# Patient Record
Sex: FEMALE | URBAN - METROPOLITAN AREA
[De-identification: names, ages, dates, MRNs, and addresses within clinical notes are randomized per-mention and may not be internally consistent; named-entity substitution may affect disease eponyms.]

---

## 2024-09-19 DIAGNOSIS — C44.311 BASAL CELL CARCINOMA (BCC) OF LEFT NASAL TIP: Primary | ICD-10-CM

## 2024-09-23 DIAGNOSIS — C44.311 BASAL CELL CARCINOMA (BCC) OF LEFT NASAL TIP: Primary | ICD-10-CM

## 2024-10-16 ENCOUNTER — OFFICE VISIT (OUTPATIENT)
Dept: SURGERY | Facility: CLINIC | Age: 64
End: 2024-10-16
Payer: MEDICARE

## 2024-10-16 DIAGNOSIS — C44.311 BASAL CELL CARCINOMA (BCC) OF LEFT NASAL TIP: Primary | ICD-10-CM

## 2024-10-16 PROCEDURE — 99203 OFFICE O/P NEW LOW 30 MIN: CPT | Performed by: SURGERY

## 2024-10-16 RX ORDER — ESTRADIOL 10 UG/1
INSERT VAGINAL
COMMUNITY
Start: 2024-08-14

## 2024-10-16 RX ORDER — SERTRALINE HYDROCHLORIDE 25 MG/1
1 TABLET, FILM COATED ORAL
COMMUNITY
Start: 2024-05-16

## 2024-10-16 RX ORDER — ROSUVASTATIN CALCIUM 5 MG/1
5 TABLET, COATED ORAL EVERY OTHER DAY
COMMUNITY
Start: 2024-05-16

## 2024-10-16 NOTE — CONSULTS
New Patient Consultation    Chief Complaint: basal cell carcinoma left nasal tip     History of Present Illness:   Gonzalo James is a 64 year old female referred by [facility] for evaluation of a recently diagnosed basal cell carcinoma, nodular pattern, of the left nasal tip. This was diagnosed via shave biopsy on 8/21/2024. She is scheduled for a MOHs excision on 11/25/2024. She is here today to discuss options for reconstruction.      Pathology from 8/21/2024 at [facility]:  Basal cell carcinoma, nodular pattern left nasal tip    Past Medical History:      No past medical history on file.      Past Surgical History:  No past surgical history on file.      Medications:    No outpatient medications have been marked as taking for the 10/16/24 encounter (Appointment) with Dorothea Martinez MD.         Allergies:    Allergies[1]      Family History:   No family history on file.      Social History:  History   Alcohol use Not on file       History   Smoking Status    Not on file   Smokeless Tobacco    Not on file       History   Drug Use Not on file       Review of Systems:    A 13 point review of systems was performed on the intake sheet.  The patient reports   General:   The patient denies, fever, chills, night sweats, fatigue, generalized weakness, change in appetite, weight loss, or weight gain.  Endocrine:   There is no history of poor/slow wound healing, weight loss/gain, fertility or hormone problems, cold intolerance, heat intolerance, excessive thirst, or thyroid disease.   Allergic/Immunologic:  There is no history of hives, hay fever, angioedema or anaphylaxis.  HEENT:    The patient denies ear pain, ear drainage, hearing loss, change in vision, double vision, cataracts, glaucoma, nasal congestion, nosebleed, hoarseness, sore throat, or swollen glands  Respiratory:   The patient denies shortness of breath, cough, bloody cough, phlegm, asthma, or wheezing  Cardiovascular:  The patient  denies chest pain/pressure, palpitations, irregular heartbeat, high blood pressure, stroke, or leg swelling  Breasts:  Patient denies breast masses, pain, change in the breast skin, skin dimpling, nipple discharge, or rash  Gastrointestinal:   There is no history of difficulty or pain with swallowing, reflux symptoms, nausea, vomiting, dark/ bloody stools, diarrhea, constipation,  change in bowel habits, or abdominal pain.   Genitourinary:  The patient denies frequent urination, needing to get up at night to urinate, urinary hesitancy or retaining urine, painful urination, urinary incontinence, decreased urine stream, blood in the urine, or vaginal/penile discharge.  Skin:   The patient denies rash, itching, skin lesions, dry skin, change in skin color or change in moles, sunburns, or sunburns with blistering.   Hematologic/Lymphatic:  The patient denies easily bruising or bleeding, persistent swollen glands or lymph nodes, bleeding disorders, blood clots, or pulmonary embolism.   Gynecologic:  The patient denies irregular menses, pelvic pain, pain with intercourse, painful menses, or pregnancy  Musculoskeletal:  The patient denies muscle aches/pain, joint pain, stiff joints, neck pain, back pain or bone pain.  Neurologic:  There is no history of migraines or severe headaches, seizure/epilepsy, speech problems, coordination problems, trembling/tremors, fainting/black outs, dizziness, memory problems, loss of sensation/numbness, problems walking, weakness, tingling or burning in hands/feet.   Psychiatric:  There is no history of abusive relationship, bipolar disorder, sleep disturbance, anxiety, depression or feeling of despair.    Physical Exam:    There were no vitals taken for this visit.    Constitutional: The patient is an alert, oriented and well-developed.     Neurologic: Speech patterns and movements are normal.     Psychiatric: Affect is appropriate.    Eyes: Conjunctiva are clear, non-icteric. PERRL    ENT:  healed biopsy site left nasal tip     Integument/Skin: see ENT exam    Respiratory: Normal respiratory effort.     Cardiovascular: no cyanosis, no edema    Musculoskeletal: Extremities unremarkable, without edema, tenderness or deformities    Impression:   Gonzalo James  is a 64 year old with basal cell carcinoma left nasal tip    Discussion and Plan:  The patient was counseled on the different treatment options.     Patient plans for Mohs excision with [facility] on 11/25/2024. We discussed that the size of the defect will determine the reconstructive options. This will be determined following Mohs excision.   We will plan for reconstruction of the left nasal tip with local tissue rearrangement, ideally bilobe flap, possible Integra, possible skin graft. This will be done under general anesthesia. We discussed the possible role for second stage reconstruction. Representative photos were reviewed with the patient.    The different treatment options were discussed with the patient.  The procedures and the postoperative care was discussed in detail.  Potential risks complications benefits and alternatives were discussed.  Risks and complications including but not limited to infection, bleeding, scarring, damage to surrounding structures, such as nerves, blood vessels, muscles,  tendons, risk of hematoma, seroma, foreign body reaction, allergic reaction, wound dehiscences, delayed wound healing, graft failure, flap failure, need for further surgery.    Patient understands and wishes to proceed with the procedure, questions were answered.    45 minutes were spent with the patient, from which 35 minutes were spent counseling the patient and coordinating care.         [1] Not on File

## 2024-10-18 DIAGNOSIS — C44.311 BASAL CELL CARCINOMA (BCC) OF LEFT NASAL TIP: Primary | ICD-10-CM

## 2024-11-19 DIAGNOSIS — C44.311 BASAL CELL CARCINOMA (BCC) OF LEFT NASAL TIP: Primary | ICD-10-CM

## 2024-11-26 ENCOUNTER — ANESTHESIA (OUTPATIENT)
Dept: SURGERY | Facility: HOSPITAL | Age: 64
End: 2024-11-26
Payer: MEDICARE

## 2024-11-26 ENCOUNTER — HOSPITAL ENCOUNTER (OUTPATIENT)
Facility: HOSPITAL | Age: 64
Setting detail: HOSPITAL OUTPATIENT SURGERY
Discharge: HOME | End: 2024-11-26
Attending: SURGERY | Admitting: SURGERY
Payer: MEDICARE

## 2024-11-26 ENCOUNTER — ANESTHESIA EVENT (OUTPATIENT)
Dept: SURGERY | Facility: HOSPITAL | Age: 64
End: 2024-11-26
Payer: MEDICARE

## 2024-11-26 VITALS
SYSTOLIC BLOOD PRESSURE: 132 MMHG | TEMPERATURE: 97 F | OXYGEN SATURATION: 94 % | WEIGHT: 140.19 LBS | BODY MASS INDEX: 24.84 KG/M2 | DIASTOLIC BLOOD PRESSURE: 66 MMHG | RESPIRATION RATE: 18 BRPM | HEIGHT: 63 IN | HEART RATE: 102 BPM

## 2024-11-26 PROCEDURE — 0HX1XZZ TRANSFER FACE SKIN, EXTERNAL APPROACH: ICD-10-PCS | Performed by: SURGERY

## 2024-11-26 RX ORDER — ACETAMINOPHEN 500 MG
1000 TABLET ORAL ONCE
Status: DISCONTINUED | OUTPATIENT
Start: 2024-11-26 | End: 2024-11-26 | Stop reason: HOSPADM

## 2024-11-26 RX ORDER — ONDANSETRON 2 MG/ML
4 INJECTION INTRAMUSCULAR; INTRAVENOUS EVERY 6 HOURS PRN
Status: DISCONTINUED | OUTPATIENT
Start: 2024-11-26 | End: 2024-11-26

## 2024-11-26 RX ORDER — CEFAZOLIN SODIUM 1 G/3ML
INJECTION, POWDER, FOR SOLUTION INTRAMUSCULAR; INTRAVENOUS AS NEEDED
Status: DISCONTINUED | OUTPATIENT
Start: 2024-11-26 | End: 2024-11-26 | Stop reason: SURG

## 2024-11-26 RX ORDER — SODIUM CHLORIDE, SODIUM LACTATE, POTASSIUM CHLORIDE, CALCIUM CHLORIDE 600; 310; 30; 20 MG/100ML; MG/100ML; MG/100ML; MG/100ML
INJECTION, SOLUTION INTRAVENOUS CONTINUOUS
Status: DISCONTINUED | OUTPATIENT
Start: 2024-11-26 | End: 2024-11-26

## 2024-11-26 RX ORDER — HYDROMORPHONE HYDROCHLORIDE 1 MG/ML
0.2 INJECTION, SOLUTION INTRAMUSCULAR; INTRAVENOUS; SUBCUTANEOUS EVERY 5 MIN PRN
Status: DISCONTINUED | OUTPATIENT
Start: 2024-11-26 | End: 2024-11-26

## 2024-11-26 RX ORDER — HYDROCODONE BITARTRATE AND ACETAMINOPHEN 5; 325 MG/1; MG/1
2 TABLET ORAL ONCE AS NEEDED
Status: DISCONTINUED | OUTPATIENT
Start: 2024-11-26 | End: 2024-11-26

## 2024-11-26 RX ORDER — DEXAMETHASONE SODIUM PHOSPHATE 4 MG/ML
VIAL (ML) INJECTION AS NEEDED
Status: DISCONTINUED | OUTPATIENT
Start: 2024-11-26 | End: 2024-11-26 | Stop reason: SURG

## 2024-11-26 RX ORDER — ACETAMINOPHEN 500 MG
1000 TABLET ORAL ONCE AS NEEDED
Status: DISCONTINUED | OUTPATIENT
Start: 2024-11-26 | End: 2024-11-26

## 2024-11-26 RX ORDER — LIDOCAINE HYDROCHLORIDE AND EPINEPHRINE 10; 10 MG/ML; UG/ML
INJECTION, SOLUTION INFILTRATION; PERINEURAL AS NEEDED
Status: DISCONTINUED | OUTPATIENT
Start: 2024-11-26 | End: 2024-11-26 | Stop reason: HOSPADM

## 2024-11-26 RX ORDER — ONDANSETRON 2 MG/ML
INJECTION INTRAMUSCULAR; INTRAVENOUS AS NEEDED
Status: DISCONTINUED | OUTPATIENT
Start: 2024-11-26 | End: 2024-11-26 | Stop reason: SURG

## 2024-11-26 RX ORDER — SCOLOPAMINE TRANSDERMAL SYSTEM 1 MG/1
1 PATCH, EXTENDED RELEASE TRANSDERMAL ONCE
Status: DISCONTINUED | OUTPATIENT
Start: 2024-11-26 | End: 2024-11-26 | Stop reason: HOSPADM

## 2024-11-26 RX ORDER — MIDAZOLAM HYDROCHLORIDE 1 MG/ML
INJECTION INTRAMUSCULAR; INTRAVENOUS AS NEEDED
Status: DISCONTINUED | OUTPATIENT
Start: 2024-11-26 | End: 2024-11-26 | Stop reason: SURG

## 2024-11-26 RX ORDER — EPHEDRINE SULFATE 50 MG/ML
INJECTION INTRAVENOUS AS NEEDED
Status: DISCONTINUED | OUTPATIENT
Start: 2024-11-26 | End: 2024-11-26 | Stop reason: SURG

## 2024-11-26 RX ORDER — HYDROMORPHONE HYDROCHLORIDE 1 MG/ML
0.4 INJECTION, SOLUTION INTRAMUSCULAR; INTRAVENOUS; SUBCUTANEOUS EVERY 5 MIN PRN
Status: DISCONTINUED | OUTPATIENT
Start: 2024-11-26 | End: 2024-11-26

## 2024-11-26 RX ORDER — HYDROCODONE BITARTRATE AND ACETAMINOPHEN 5; 325 MG/1; MG/1
1 TABLET ORAL ONCE AS NEEDED
Status: DISCONTINUED | OUTPATIENT
Start: 2024-11-26 | End: 2024-11-26

## 2024-11-26 RX ORDER — NALOXONE HYDROCHLORIDE 0.4 MG/ML
0.08 INJECTION, SOLUTION INTRAMUSCULAR; INTRAVENOUS; SUBCUTANEOUS AS NEEDED
Status: DISCONTINUED | OUTPATIENT
Start: 2024-11-26 | End: 2024-11-26

## 2024-11-26 RX ORDER — PROCHLORPERAZINE EDISYLATE 5 MG/ML
5 INJECTION INTRAMUSCULAR; INTRAVENOUS EVERY 8 HOURS PRN
Status: DISCONTINUED | OUTPATIENT
Start: 2024-11-26 | End: 2024-11-26

## 2024-11-26 RX ORDER — HYDROMORPHONE HYDROCHLORIDE 1 MG/ML
0.6 INJECTION, SOLUTION INTRAMUSCULAR; INTRAVENOUS; SUBCUTANEOUS EVERY 5 MIN PRN
Status: DISCONTINUED | OUTPATIENT
Start: 2024-11-26 | End: 2024-11-26

## 2024-11-26 RX ORDER — PHENYLEPHRINE HCL 10 MG/ML
VIAL (ML) INJECTION AS NEEDED
Status: DISCONTINUED | OUTPATIENT
Start: 2024-11-26 | End: 2024-11-26 | Stop reason: SURG

## 2024-11-26 RX ORDER — KETOROLAC TROMETHAMINE 30 MG/ML
INJECTION, SOLUTION INTRAMUSCULAR; INTRAVENOUS AS NEEDED
Status: DISCONTINUED | OUTPATIENT
Start: 2024-11-26 | End: 2024-11-26 | Stop reason: SURG

## 2024-11-26 RX ORDER — LIDOCAINE HYDROCHLORIDE 10 MG/ML
INJECTION, SOLUTION EPIDURAL; INFILTRATION; INTRACAUDAL; PERINEURAL AS NEEDED
Status: DISCONTINUED | OUTPATIENT
Start: 2024-11-26 | End: 2024-11-26 | Stop reason: SURG

## 2024-11-26 RX ADMIN — PHENYLEPHRINE HCL 100 MCG: 10 MG/ML VIAL (ML) INJECTION at 09:46:00

## 2024-11-26 RX ADMIN — KETOROLAC TROMETHAMINE 30 MG: 30 INJECTION, SOLUTION INTRAMUSCULAR; INTRAVENOUS at 09:40:00

## 2024-11-26 RX ADMIN — PHENYLEPHRINE HCL 100 MCG: 10 MG/ML VIAL (ML) INJECTION at 09:33:00

## 2024-11-26 RX ADMIN — LIDOCAINE HYDROCHLORIDE 50 MG: 10 INJECTION, SOLUTION EPIDURAL; INFILTRATION; INTRACAUDAL; PERINEURAL at 09:28:00

## 2024-11-26 RX ADMIN — EPHEDRINE SULFATE 10 MG: 50 INJECTION INTRAVENOUS at 10:03:00

## 2024-11-26 RX ADMIN — ONDANSETRON 4 MG: 2 INJECTION INTRAMUSCULAR; INTRAVENOUS at 09:40:00

## 2024-11-26 RX ADMIN — MIDAZOLAM HYDROCHLORIDE 2 MG: 1 INJECTION INTRAMUSCULAR; INTRAVENOUS at 09:24:00

## 2024-11-26 RX ADMIN — SODIUM CHLORIDE, SODIUM LACTATE, POTASSIUM CHLORIDE, CALCIUM CHLORIDE: 600; 310; 30; 20 INJECTION, SOLUTION INTRAVENOUS at 09:22:00

## 2024-11-26 RX ADMIN — CEFAZOLIN SODIUM 2 G: 1 INJECTION, POWDER, FOR SOLUTION INTRAMUSCULAR; INTRAVENOUS at 09:38:00

## 2024-11-26 RX ADMIN — EPHEDRINE SULFATE 10 MG: 50 INJECTION INTRAVENOUS at 10:24:00

## 2024-11-26 RX ADMIN — DEXAMETHASONE SODIUM PHOSPHATE 8 MG: 4 MG/ML VIAL (ML) INJECTION at 09:40:00

## 2024-11-26 NOTE — BRIEF OP NOTE
Pre-Operative Diagnosis: Basal cell carcinoma (BCC) of left nasal tip [C44.311]     Post-Operative Diagnosis: Basal cell carcinoma (BCC) of left nasal tip [C44.311]      Procedure Performed:   Reconstruction of the left nasal tip with local tissue rearrangement,    Surgeons and Role:     * Dorothea Martinez MD - Primary    Assistant(s):  PA: Fabby Guzman PA     Surgical Findings: see dictation     Specimen: none     Estimated Blood Loss: Blood Output: 2 mL (11/26/2024 10:43 AM)    MACK Peter  11/26/2024  10:56 AM

## 2024-11-26 NOTE — ANESTHESIA POSTPROCEDURE EVALUATION
[facility]    Gonzalo James Patient Status:  Hospital Outpatient Surgery   Age/Gender 64 year old female MRN [id number]   Location [facility] PERIOPERATIVE SERVICE Attending Dorothea Martinez MD   Hosp Day # 0 PCP Lauri Curran       Anesthesia Post-op Note    Reconstruction of the left nasal tip with local tissue rearrangement,    Procedure Summary       Date: 11/26/24 Room / Location:  MAIN OR 06 /  MAIN OR    Anesthesia Start: 0922 Anesthesia Stop: 1056    Procedure: Reconstruction of the left nasal tip with local tissue rearrangement, Diagnosis:       Basal cell carcinoma (BCC) of left nasal tip      (Basal cell carcinoma (BCC) of left nasal tip [C44.311])    Surgeons: Dorothea Martinez MD Anesthesiologist: Heraclio Leong MD    Anesthesia Type: general ASA Status: 2            Anesthesia Type: general    Vitals Value Taken Time   /66 11/26/24 1148   Temp 97 °F (36.1 °C) 11/26/24 1118   Pulse 102 11/26/24 1153   Resp 18 11/26/24 1118   SpO2 94 % 11/26/24 1153   Vitals shown include unfiled device data.    Patient Location: PACU    Anesthesia Type: general    Airway Patency: patent and extubated    Postop Pain Control: adequate    Mental Status: preanesthetic baseline    Nausea/Vomiting: none    Cardiopulmonary/Hydration status: stable euvolemic    Complications: no apparent anesthesia related complications    Postop vital signs: stable    Comments: Report given to RN    Dental Exam: Unchanged from Preop    Patient to be discharged from PACU when criteria met.

## 2024-11-26 NOTE — ANESTHESIA PROCEDURE NOTES
Airway  Date/Time: 11/26/2024 9:29 AM  Urgency: elective    Airway not difficult    General Information and Staff    Patient location during procedure: OR  Anesthesiologist: Heraclio Leong MD  Performed: anesthesiologist   Performed by: Heraclio Leong MD  Authorized by: Heraclio Leong MD      Indications and Patient Condition  Indications for airway management: anesthesia  Sedation level: deep  Preoxygenated: yes  Patient position: sniffing  Mask difficulty assessment: 0 - not attempted    Final Airway Details  Final airway type: supraglottic airway      Successful airway: flexible  Size 4       Number of attempts at approach: 1

## 2024-11-26 NOTE — DISCHARGE INSTRUCTIONS
Plastic Surgery Home Care Instructions      We hope you were pleased with your care at [facility].  We wish you the best outcome and overall experience with your operation.  These instructions will help to minimize pain, optimize healing, and improve the likelihood of a successful result.    What To Expect  There may be some spotting of the incision lines for the next few days.  Mild bruising, mild swelling and discomfort in the surgical area is typical.     Bandages (Dressing)  Leave steri-strips in place. If these fall off on their own, that is ok. Apply antibiotic ointment (neosporin, bacitracin, etc) daily if the steri-strips fall off.  Apply antibiotic ointment inside the left nostril.     Bathing/Showers  You can resume showering tomorrow. Let soap and water run over the incision, don't scrub.   No baths, swimming, or hot tubs until you receive medical permission    Over-The-Counter Medication  You can take Tylenol after surgery for pain relief as needed  You can take Aleve or ibuprofen starting 24 hours after surgery  Take as directed on the bottle    Home Medication  Resume your home medications as instructed  Do not resume herbal medications for two weeks    Diet  Resume your normal diet    Activity  Avoid strenuous activity     Return to Work or School  You can return to work when you are not taking pain medication, if your work does not involve strenuous activity.  Contact the office, if you need a medical note.     Follow-up Appointment   Our office will call you to set up a time    Questions or Concerns  Call Dr. Martinez's office if you experience bleeding that is not controlled by holding pressure for 20 minutes.     Gonzalo   Thank you for coming to [facility] for your operation.  The nurses and the anesthesiologist try very hard to make sure you receive the best care possible.  Your trust in them is greatly appreciated.    Thanks so much,  Dr. Juan Guzman PA-C    You received a  drug called Toradol which is an Anti Inflammatory at: 9:40am  If you are allowed to take Anti inflammatories:    Do not take any Anti Inflammatory like Motrin, Aleve or Ibuprofen until after: 3:40pm  Please report any suspected allergic reactions or bleeding issues to your doctor

## 2024-11-26 NOTE — ANESTHESIA PREPROCEDURE EVALUATION
PRE-OP EVALUATION    Patient Name: Gonzalo James    Admit Diagnosis: Basal cell carcinoma (BCC) of left nasal tip [C44.311]    Pre-op Diagnosis: Basal cell carcinoma (BCC) of left nasal tip [C44.311]    Reconstruction of the left nasal tip with local tissue rearrangement, possible Integra, possible skin graft    Anesthesia Procedure: Reconstruction of the left nasal tip with local tissue rearrangement, possible Integra, possible skin graft    Surgeons and Role:     * Dorothea Martinez MD - Primary    Pre-op vitals reviewed.  Temp: 97.7 °F (36.5 °C)  Pulse: 79  Resp: 16  BP: 126/74  SpO2: 98 %  Body mass index is 24.84 kg/m².    Current medications reviewed.  Hospital Medications:   acetaminophen (Tylenol Extra Strength) tab 1,000 mg  1,000 mg Oral Once    scopolamine (Transderm-Scop) 1 MG/3DAYS patch 1 patch  1 patch Transdermal Once    lactated ringers infusion   Intravenous Continuous    ceFAZolin (Ancef) 2g in 10mL IV syringe premix  2 g Intravenous Once       Outpatient Medications:   Prescriptions Prior to Admission[1]    Allergies: Patient has no known allergies.      Anesthesia Evaluation    Patient summary reviewed.    Anesthetic Complications  (-) history of anesthetic complications         GI/Hepatic/Renal    Negative GI/hepatic/renal ROS.                             Cardiovascular    Negative cardiovascular ROS.    Exercise tolerance: good     MET: >4                                           Endo/Other    Negative endo/other ROS.                              Pulmonary    Negative pulmonary ROS.                       Neuro/Psych      (+) depression    (-) CVA                            Past Surgical History:   Procedure Laterality Date    Brain surgery      Tubal ligation       Social History     Socioeconomic History    Marital status:    Tobacco Use    Smoking status: Never    Smokeless tobacco: Never   Vaping Use    Vaping status: Never Used   Substance and Sexual Activity    Alcohol  use: Never    Drug use: Never     History   Drug Use Unknown     Available pre-op labs reviewed.               Airway      Mallampati: II  Mouth opening: >3 FB     Cardiovascular    Cardiovascular exam normal.         Dental             Pulmonary    Pulmonary exam normal.                 Other findings              ASA: 2   Plan: general  NPO status verified and           Plan/risks discussed with: patient                Present on Admission:  **None**             [1]   Medications Prior to Admission   Medication Sig Dispense Refill Last Dose/Taking    sertraline 25 MG Oral Tab Take 1 tablet (25 mg total) by mouth daily with breakfast.   Taking    rosuvastatin 5 MG Oral Tab Take 1 tablet (5 mg total) by mouth every other day.   Taking    Estradiol 10 MCG Vaginal Tab    Taking

## 2024-11-27 NOTE — OPERATIVE REPORT
[facility]    PATIENT'S NAME: ALAN TAYLOR   ATTENDING PHYSICIAN: Dorothea Martinez MD   OPERATING PHYSICIAN: Dorothea Martinez MD   PATIENT ACCOUNT#:   [id number]    LOCATION:  [address]  MEDICAL RECORD #:   [id number]       YOB: 1960  ADMISSION DATE:       11/26/2024      OPERATION DATE:  11/26/2024    OPERATIVE REPORT    PREOPERATIVE DIAGNOSIS:  Open wound nasal tip, history of basal cell cancer, status post Mohs excision.    POSTOPERATIVE DIAGNOSIS:  Open wound nasal tip, history of basal cell cancer, status post Mohs excision.    PROCEDURE:  Nasal reconstruction with bilobed flap, 2 sq cm.    ASSISTANT:  Fabby Guzman PA-C.    ANESTHESIA:  General endotracheal anesthesia.    COMPLICATIONS:  None.    BLOOD LOSS:  Minimal.    INDICATIONS:  This is a 64-year-old female with a basal cell cancer on her nasal tip for which she underwent Mohs resection and now presents for reconstruction.  She was seen in the office preoperatively and the plan was reviewed.  Potential risks, complications, benefits, and alternatives were discussed.  Risks and complications included, but were not limited to, infection, bleeding, scarring, damage to surrounding structures, flap failure, wound dehiscence, nasal obstruction, nasal deformity, and need for further surgery.  The patient understands and wishes to proceed.  Questions were answered.    OPERATIVE TECHNIQUE:  The patient was identified in the preoperative area, informed consent was obtained, the site was marked.  The patient was then brought back to the operating room, placed in supine position.  She was adequately padded, sequential compression devices were applied.  General endotracheal anesthesia was administered.  Perioperative antibiotics were given.  Her entire face was prepped and draped in the usual sterile fashion.  The procedure was started with identifying the defect which was measuring 1.3 x 1.3 cm.  The defect was  full thickness and patient had some laxity of the surrounding tissues nearby.  Therefore, a bilobed flap was drawn in the standard fashion and 1% lidocaine with epinephrine was injected surrounding the incision.  It was noted that there was a slight collapse of the left nostril compared to the right nostril and the lower lateral cartilage was partially exposed.  It was covering perichondrium.  The bilobed flap was then incised and then transposed into the defect.  Bilobed flap was elevated in the subcutaneous plane and inset with 5-0 Vicryl sutures for the deep dermal layer and 5-0 Prolene sutures for the skin.  To address the left nostril obstruction and internal valve collapse, a small dog ear was excised at the inferior aspect toward the nasal ala and then also from the inside of the nose.  Small incision was made along the lower and upper lateral cartilage junction.  The cartilage was tacked back to the nasalis muscle with a 5-0 Vicryl suture and this opened up the internal nasal valve.  Then, a 5-0 chromic suture was used for the intranasal skin repair.  The bilobed flap appeared healthy, and good shape and contour of the nose was restored after repair of the defect.  Steri-Strips were applied.  The patient tolerated the procedure well and awoke from anesthesia without difficulty.  All sponge and needle counts were correct at the end of the case.    Dictated By Dorothea Martinez MD  d: 11/26/2024 22:11:29  t: 11/27/2024 04:34:40  [facility] [id number]  \[facility]\""/

## 2024-12-03 NOTE — PROGRESS NOTES
Gonzalo James is a 64 year old female who presents today for a follow-up after nasal reconstruction with bilobed flap, 2 sq cm, on 11/26/2024.   Her reconstruction was following a MOHs excision of basal cell carcinoma, nodular pattern, of left nasal tip with [facility].      She denies fever and chills. She denies nausea, vomiting, diarrhea or constipation.   Her pain is controlled.  She is here today for suture removal and wound check.    Physical Exam     HEENT: Nasal incisions with prolene sutures in place. Clean, dry and intact without wound drainage or wound dehiscence. No erythema. No hematoma/seroma. Flap viable.     There were no vitals filed for this visit.      Assessment and Plan     Gonzalo James is doing well s/p nasal reconstruction with bilobed flap, 2 sq cm, on 11/26/2024.     The prolene sutures were removed today. The patient tolerated this well. Neosporin ointment was applied. She will apply this for the next week and can then begin scar management.     We reviewed options for scar management including vitamin E oil, silicone products, scar massage and sun protection/sun block.    She will follow up in 3-6 weeks. She was encouraged to contact our office with any questions or concerns.     Questions were answered. Patient understands.

## 2024-12-04 ENCOUNTER — OFFICE VISIT (OUTPATIENT)
Dept: SURGERY | Facility: CLINIC | Age: 64
End: 2024-12-04
Payer: MEDICARE

## 2024-12-04 DIAGNOSIS — C44.311 BASAL CELL CARCINOMA (BCC) OF LEFT NASAL TIP: Primary | ICD-10-CM

## 2024-12-04 PROCEDURE — 99024 POSTOP FOLLOW-UP VISIT: CPT | Performed by: SURGERY

## 2025-03-05 ENCOUNTER — OFFICE VISIT (OUTPATIENT)
Dept: SURGERY | Facility: CLINIC | Age: 65
End: 2025-03-05
Payer: MEDICARE

## 2025-03-05 DIAGNOSIS — C44.311 BASAL CELL CARCINOMA (BCC) OF LEFT NASAL TIP: Primary | ICD-10-CM

## 2025-03-05 NOTE — PROGRESS NOTES
Estabilshed Patient Consultation    Chief Complaint: scar check    History of Present Illness:   Gonzalo James is a 64 year old female who returns to the office for scar check. She has a history of nasal reconstruction with bilobed flap, 2 sq cm, on 11/26/2024 with Dr Martinez. Her reconstruction was following a MOHs excision of basal cell carcinoma, nodular pattern, of left nasal tip with [facility]. She has no complaints or concerns today. She is doing well. She used vitamin E oil for scar management. She is happy with her nasal reconstruction. She states she does have sensation on her nasal tip. She denies any issues breathing through her nose. She denies any redness or pain on her nose. She is accompanied by her  today. She follows up with [facility] for skin checks regularly.    Past Medical History:      Past Medical History:    Depression    High cholesterol    Osteoarthritis         Past Surgical History:  Past Surgical History:   Procedure Laterality Date    Brain surgery      Tubal ligation         Medications:    No outpatient medications have been marked as taking for the 3/5/25 encounter (Office Visit) with Leti Matos APRN.       Allergies:    Allergies[1]      Family History:   No family history on file.      Social History:  History   Alcohol Use Never       History   Smoking Status    Never   Smokeless Tobacco    Never       History   Drug Use Unknown         Review of Systems:    The patient reports: see HPI  All other systems are unremarkable.      Physical Exam:    There were no vitals taken for this visit.    Constitutional: The patient is an alert, oriented and well-developed.     Neurologic: Speech patterns and movements are normal.     Psychiatric: Affect is appropriate.    Eyes: Conjunctiva are clear, non-icteric. PERRL    ENT: no obvious abnormality, no ear drainage, mucous membranes moist and pink. Nasal tip non tender to palpation.    Integument/Skin: The  skin appears normal. There are no suspicious appearing rashes or lesions. Nasal incision remains well healed. Flap viable. No erythema or ecchymosis.     Respiratory: Normal respiratory effort.     Cardiovascular: no cyanosis, no edema    Lymphatic:  There is no cervical, supraclavicular, or axillary lymphadenopathy appreciated.    Musculoskeletal: Extremities unremarkable, without edema, tenderness or deformities    Impression:   Gonzalo James  is a 64 year old female s/p nasal reconstruction with bilobed flap, 2 sq cm, on 11/26/2024     Discussion and Plan:    We reviewed today that her exam is normal. Her nasal flap is viable. There are no signs of infection. She may continue scar massage daily if she prefers. We discussed the importance of applying sunscreen daily on her face, neck, and other areas of exposed skin and reapplying throughout the day. She has been following up with [facility] for skin checks. She may follow up with us as needed. She was agreeable with this plan. All her questions and concerns were addressed today.    15 minutes were spent with the patient, from which 10 minutes were spent counseling the patient, chart review, physical exam, and coordinating care.    Leti Matos, APRN  3/5/2025  9:00 AM       [1] No Known Allergies

## (undated) DEVICE — MARKER SKIN PREP-RESISTANT ST: Brand: MEDLINE INDUSTRIES, INC.

## (undated) DEVICE — SUT PROL SZ 5-0 36IN RB-1 NABSRB BL

## (undated) DEVICE — GLOVE SUR 6.5 SENSICARE PI PIP CRM PWD F

## (undated) DEVICE — LAPAROTOMY SPONGE - RF AND X-RAY DETECTABLE PRE-WASHED: Brand: SITUATE

## (undated) DEVICE — SUT PROL 5-0 30IN RB-2 NABSRB BLU L13MM 1/2 C

## (undated) DEVICE — SUT COAT VCRL+ 5-0 27IN RB-1 ABSRB UD ANTIBAC

## (undated) DEVICE — PACK DRAPE HEAD/NECK STER

## (undated) DEVICE — COVER,LIGHT,CAMERA,HARD,1/PK,STRL: Brand: MEDLINE

## (undated) DEVICE — GLOVE SUR 7 SENSICARE PI PIP GRN PWD F

## (undated) DEVICE — PREMIUM WET SKIN PREP TRAY: Brand: MEDLINE INDUSTRIES, INC.

## (undated) DEVICE — SLEEVE COMPR MD KNEE LEN SGL USE KENDALL SCD

## (undated) DEVICE — SUT PROL 6-0 18IN RB-2 NABSRB BLU L13MM 1/2 C

## (undated) DEVICE — ELECTRODE ES 2.75IN PTFE BLDE MOD E-Z CLN

## (undated) DEVICE — Device

## (undated) DEVICE — CABLE BPLR L12FT FLYING LD DISP

## (undated) DEVICE — SOLUTION IRRIG 1000ML 0.9% NACL USP BTL

## (undated) DEVICE — SUT CHRM GUT 5-0 27IN RB-1 ABSRB UD 17MM 1/2

## (undated) DEVICE — SOLUTION PREP 30ML 5% POVIDONE IOD EYE BDINE